# Patient Record
Sex: MALE | Race: WHITE | NOT HISPANIC OR LATINO | Employment: OTHER | URBAN - METROPOLITAN AREA
[De-identification: names, ages, dates, MRNs, and addresses within clinical notes are randomized per-mention and may not be internally consistent; named-entity substitution may affect disease eponyms.]

---

## 2024-01-24 ENCOUNTER — OFFICE VISIT (OUTPATIENT)
Dept: URGENT CARE | Facility: CLINIC | Age: 29
End: 2024-01-24
Payer: COMMERCIAL

## 2024-01-24 VITALS
HEART RATE: 100 BPM | OXYGEN SATURATION: 96 % | DIASTOLIC BLOOD PRESSURE: 84 MMHG | SYSTOLIC BLOOD PRESSURE: 128 MMHG | TEMPERATURE: 97.8 F | RESPIRATION RATE: 20 BRPM

## 2024-01-24 DIAGNOSIS — Z78.9 RECENT FOREIGN TRAVEL: ICD-10-CM

## 2024-01-24 DIAGNOSIS — J45.909 ACUTE ASTHMA (HHS-HCC): Primary | ICD-10-CM

## 2024-01-24 PROCEDURE — 1036F TOBACCO NON-USER: CPT | Performed by: PHYSICIAN ASSISTANT

## 2024-01-24 PROCEDURE — 99203 OFFICE O/P NEW LOW 30 MIN: CPT | Performed by: PHYSICIAN ASSISTANT

## 2024-01-24 RX ORDER — PREDNISONE 20 MG/1
20 TABLET ORAL 2 TIMES DAILY
Qty: 6 TABLET | Refills: 0 | Status: SHIPPED | OUTPATIENT
Start: 2024-01-24 | End: 2024-01-27

## 2024-01-24 RX ORDER — AZITHROMYCIN 250 MG/1
TABLET, FILM COATED ORAL
Qty: 6 TABLET | Refills: 0 | Status: SHIPPED | OUTPATIENT
Start: 2024-01-24

## 2024-01-24 ASSESSMENT — ENCOUNTER SYMPTOMS
COUGH: 1
WHEEZING: 1

## 2024-01-24 ASSESSMENT — PAIN SCALES - GENERAL: PAINLEVEL: 0-NO PAIN

## 2024-01-24 NOTE — LETTER
January 24, 2024     Patient: Taras Weir   YOB: 1995   Date of Visit: 1/24/2024       To Whom it May Concern:    Taras Weir was seen in my clinic on 1/24/2024. He may return to school on 25 January 2024 .    If you have any questions or concerns, please don't hesitate to call.         Sincerely,          Oliver Rosales PA-C        CC: No Recipients

## 2024-01-24 NOTE — PROGRESS NOTES
Subjective   Patient ID: Taras Weir is a 28 y.o. male.    Patient is a 28-year-old male who complains of increased wheezing and cough that he has been experiencing for the past 2 days.  Patient does have a history of asthma and reports that he has been using his inhalation medications as directed with no improvement in his symptoms.  Patient denies fever, chills or myalgia as well as congestion, sinus pressure, ear pain or sore throat.  Patient states that he did recently return from a vacation to [facility] via airline.      Wheezing  Associated symptoms: cough    The following portions of the chart were reviewed this encounter and updated as appropriate:       Review of Systems   Respiratory:  Positive for cough and wheezing.    All other systems reviewed and are negative.  Objective   Physical Exam  Vitals and nursing note reviewed.   Constitutional:       Appearance: Normal appearance. He is normal weight.   HENT:      Head: Normocephalic and atraumatic.      Right Ear: Tympanic membrane, ear canal and external ear normal.      Left Ear: Tympanic membrane, ear canal and external ear normal.      Nose: Nose normal. No congestion or rhinorrhea.      Mouth/Throat:      Mouth: Mucous membranes are moist.      Pharynx: Oropharynx is clear. No oropharyngeal exudate or posterior oropharyngeal erythema.   Eyes:      Extraocular Movements: Extraocular movements intact.      Conjunctiva/sclera: Conjunctivae normal.      Pupils: Pupils are equal, round, and reactive to light.   Cardiovascular:      Rate and Rhythm: Normal rate and regular rhythm.      Pulses: Normal pulses.      Heart sounds: Normal heart sounds.   Pulmonary:      Effort: Pulmonary effort is normal. No respiratory distress.      Breath sounds: Normal breath sounds. No stridor. No wheezing, rhonchi or rales.   Musculoskeletal:      Cervical back: Normal range of motion and neck supple.   Skin:     General: Skin is warm and dry.      Capillary Refill:  Capillary refill takes less than 2 seconds.   Neurological:      General: No focal deficit present.      Mental Status: He is alert and oriented to person, place, and time.   Psychiatric:         Mood and Affect: Mood normal.         Behavior: Behavior normal.         Thought Content: Thought content normal.         Judgment: Judgment normal.     Assessment/Plan   Physical exam findings as noted above.  Patient was provided with prescriptions for Zithromax 250 mg and prednisone 20 mg.  Patient states he does not require refill of any of his inhalation medications.  Supportive care instructions were discussed the patient verbalizes excellent understanding of same.    CLINICAL IMPRESSION:  Acute Asthma Exacerbation;  Recent Foreign Travel    Diagnoses and all orders for this visit:  Acute asthma  -     predniSONE (Deltasone) 20 mg tablet; Take 1 tablet (20 mg) by mouth 2 times a day for 3 days.  -     azithromycin (Zithromax) 250 mg tablet; Take 2 tablets (500 mg) on  Day 1,  followed by 1 tablet (250 mg) once daily on Days 2 through 5.  Recent foreign travel

## 2024-01-24 NOTE — LETTER
January 25, 2024     Patient: Taras Weir   YOB: 1995   Date of Visit: 1/24/2024       To Whom It May Concern:    It is my medical opinion that Taras Weir may return to work on 29 January 2024 .    If you have any questions or concerns, please don't hesitate to call.         Sincerely,        Oliver Rosales PA-C    CC: No Recipients

## 2024-03-05 ENCOUNTER — OFFICE VISIT (OUTPATIENT)
Dept: URGENT CARE | Facility: CLINIC | Age: 29
End: 2024-03-05
Payer: COMMERCIAL

## 2024-03-05 VITALS
SYSTOLIC BLOOD PRESSURE: 126 MMHG | DIASTOLIC BLOOD PRESSURE: 86 MMHG | RESPIRATION RATE: 20 BRPM | OXYGEN SATURATION: 100 % | HEART RATE: 98 BPM | TEMPERATURE: 100.3 F

## 2024-03-05 DIAGNOSIS — J10.1 INFLUENZA A: Primary | ICD-10-CM

## 2024-03-05 DIAGNOSIS — R50.9 FEVER AND CHILLS: ICD-10-CM

## 2024-03-05 LAB
POC TRIPLEX FLU A-AG: ABNORMAL
POC TRIPLEX FLU B-AG: ABNORMAL
POC TRIPLEX SARSCOV-2 AG: ABNORMAL

## 2024-03-05 PROCEDURE — 99213 OFFICE O/P EST LOW 20 MIN: CPT | Performed by: PHYSICIAN ASSISTANT

## 2024-03-05 PROCEDURE — 87428 SARSCOV & INF VIR A&B AG IA: CPT | Performed by: PHYSICIAN ASSISTANT

## 2024-03-05 PROCEDURE — 1036F TOBACCO NON-USER: CPT | Performed by: PHYSICIAN ASSISTANT

## 2024-03-05 RX ORDER — BENZONATATE 100 MG/1
100 CAPSULE ORAL 3 TIMES DAILY PRN
Qty: 30 CAPSULE | Refills: 0 | Status: SHIPPED | OUTPATIENT
Start: 2024-03-05 | End: 2024-03-15

## 2024-03-05 RX ORDER — OSELTAMIVIR PHOSPHATE 75 MG/1
75 CAPSULE ORAL 2 TIMES DAILY
Qty: 10 CAPSULE | Refills: 0 | Status: SHIPPED | OUTPATIENT
Start: 2024-03-05 | End: 2024-03-10

## 2024-03-05 ASSESSMENT — ENCOUNTER SYMPTOMS
FEVER: 1
MYALGIAS: 1
SORE THROAT: 1
FATIGUE: 1
CHILLS: 1
COUGH: 1

## 2024-03-05 ASSESSMENT — PAIN SCALES - GENERAL: PAINLEVEL: 7

## 2024-03-05 NOTE — PROGRESS NOTES
Subjective   Patient ID: Taras Weir is a 28 y.o. male.    Patient is a 28-year-old male who complains of fever, chills and myalgia that he has been experiencing for the past 3 days.  Patient reports that he has also developed a dry, burning cough.  Patient states that he lives with other individuals who have also developed the same symptoms over the past several days.  Patient denies congestion, sinus pressure or ear pain.  Patient does have a sore throat which he believes is secondary to repeated coughing.      The following portions of the chart were reviewed this encounter and updated as appropriate:       Review of Systems   Constitutional:  Positive for chills, fatigue and fever.   HENT:  Positive for sore throat.    Respiratory:  Positive for cough.    Musculoskeletal:  Positive for myalgias.   All other systems reviewed and are negative.  Objective   Physical Exam  Vitals and nursing note reviewed.   Constitutional:       Appearance: Normal appearance. He is normal weight.   HENT:      Head: Normocephalic and atraumatic.      Right Ear: Tympanic membrane, ear canal and external ear normal.      Left Ear: Tympanic membrane, ear canal and external ear normal.      Nose: Nose normal. No congestion or rhinorrhea.      Mouth/Throat:      Mouth: Mucous membranes are moist.      Pharynx: Oropharynx is clear. No oropharyngeal exudate or posterior oropharyngeal erythema.   Eyes:      Extraocular Movements: Extraocular movements intact.      Conjunctiva/sclera: Conjunctivae normal.      Pupils: Pupils are equal, round, and reactive to light.   Cardiovascular:      Rate and Rhythm: Normal rate and regular rhythm.      Pulses: Normal pulses.      Heart sounds: Normal heart sounds.   Pulmonary:      Effort: Pulmonary effort is normal. No respiratory distress.      Breath sounds: Normal breath sounds. No stridor. No wheezing, rhonchi or rales.   Musculoskeletal:      Cervical back: Normal range of motion and neck supple.    Skin:     General: Skin is warm and dry.      Capillary Refill: Capillary refill takes less than 2 seconds.   Neurological:      General: No focal deficit present.      Mental Status: He is alert and oriented to person, place, and time.   Psychiatric:         Mood and Affect: Mood normal.         Behavior: Behavior normal.         Thought Content: Thought content normal.         Judgment: Judgment normal.     Assessment/Plan   Physical exam findings as noted above.  Triplex Ag was performed and influenza A/B is positive for influenza Type A.  SARS-CoV-2 is negative.  Patient was provided with prescriptions for Tamiflu 75 mg and Tessalon 100 mg.  Supportive care instructions were discussed and the patient verbalizes good understanding of same.    CLINICAL IMPRESSION:  Influenza Type A    Diagnoses and all orders for this visit:  Influenza A  -     benzonatate (Tessalon Perles) 100 mg capsule; Take 1 capsule (100 mg) by mouth 3 times a day as needed for cough for up to 10 days.  -     oseltamivir (Tamiflu) 75 mg capsule; Take 1 capsule (75 mg) by mouth 2 times a day for 5 days.  Fever and chills  -     POCT BD Veritor Triplex Ag manually resulted

## 2024-03-05 NOTE — LETTER
March 5, 2024     Patient: Taras Weir   YOB: 1995   Date of Visit: 3/5/2024       To Whom it May Concern:    Taras Weir was seen in my clinic on 3/5/2024. He may return to school on 11 March 2024 .    If you have any questions or concerns, please don't hesitate to call.         Sincerely,          Oliver Rosales PA-C        CC: No Recipients

## 2024-05-08 ENCOUNTER — HOSPITAL ENCOUNTER (EMERGENCY)
Facility: HOSPITAL | Age: 29
Discharge: HOME | End: 2024-05-08
Payer: COMMERCIAL

## 2024-05-08 VITALS
TEMPERATURE: 97.2 F | DIASTOLIC BLOOD PRESSURE: 77 MMHG | RESPIRATION RATE: 18 BRPM | HEIGHT: 71 IN | OXYGEN SATURATION: 96 % | BODY MASS INDEX: 23.1 KG/M2 | HEART RATE: 84 BPM | WEIGHT: 165 LBS | SYSTOLIC BLOOD PRESSURE: 132 MMHG

## 2024-05-08 DIAGNOSIS — S05.02XA ABRASION OF LEFT CORNEA, INITIAL ENCOUNTER: ICD-10-CM

## 2024-05-08 DIAGNOSIS — S01.81XA FACIAL LACERATION, INITIAL ENCOUNTER: Primary | ICD-10-CM

## 2024-05-08 PROCEDURE — 12011 RPR F/E/E/N/L/M 2.5 CM/<: CPT | Performed by: NURSE PRACTITIONER

## 2024-05-08 PROCEDURE — 2500000001 HC RX 250 WO HCPCS SELF ADMINISTERED DRUGS (ALT 637 FOR MEDICARE OP): Performed by: NURSE PRACTITIONER

## 2024-05-08 PROCEDURE — 99283 EMERGENCY DEPT VISIT LOW MDM: CPT | Mod: 25

## 2024-05-08 RX ORDER — TETRACAINE HYDROCHLORIDE 5 MG/ML
1 SOLUTION OPHTHALMIC ONCE
Status: COMPLETED | OUTPATIENT
Start: 2024-05-08 | End: 2024-05-08

## 2024-05-08 RX ORDER — POLYMYXIN B SULFATE AND TRIMETHOPRIM 1; 10000 MG/ML; [USP'U]/ML
1 SOLUTION OPHTHALMIC
Qty: 10 ML | Refills: 0 | Status: SHIPPED | OUTPATIENT
Start: 2024-05-08 | End: 2024-05-15

## 2024-05-08 RX ORDER — POLYMYXIN B SULFATE AND TRIMETHOPRIM 1; 10000 MG/ML; [USP'U]/ML
1 SOLUTION OPHTHALMIC ONCE
Status: COMPLETED | OUTPATIENT
Start: 2024-05-08 | End: 2024-05-08

## 2024-05-08 RX ADMIN — TETRACAINE HYDROCHLORIDE 1 DROP: 5 SOLUTION OPHTHALMIC at 13:35

## 2024-05-08 RX ADMIN — FLUORESCEIN SODIUM 1 STRIP: 1 STRIP OPHTHALMIC at 13:35

## 2024-05-08 RX ADMIN — POLYMYXIN B SULFATE AND TRIMETHOPRIM 1 DROP: 10000; 1 SOLUTION OPHTHALMIC at 14:11

## 2024-05-08 ASSESSMENT — COLUMBIA-SUICIDE SEVERITY RATING SCALE - C-SSRS
2. HAVE YOU ACTUALLY HAD ANY THOUGHTS OF KILLING YOURSELF?: NO
1. IN THE PAST MONTH, HAVE YOU WISHED YOU WERE DEAD OR WISHED YOU COULD GO TO SLEEP AND NOT WAKE UP?: NO
6. HAVE YOU EVER DONE ANYTHING, STARTED TO DO ANYTHING, OR PREPARED TO DO ANYTHING TO END YOUR LIFE?: NO

## 2024-05-08 ASSESSMENT — VISUAL ACUITY
OD: 20/25
OU: 20/25
OS: 20/40

## 2024-05-08 NOTE — ED TRIAGE NOTES
Pt presents to ED for report of eye injury. Pt hit left under eye on the corner of a speaker last night. Pt lynn laceration under left eye. Bleeding controlled.

## 2024-05-08 NOTE — ED PROVIDER NOTES
HPI   Chief Complaint   Patient presents with    Eye Problem       Patient is a 28-year-old male with past med history of asthma who presents ED today after hitting his left face on the corner of a podium.  Patient states he was play fighting with a friend and fell into the podium.  He struck his face on the corner and it seemed to cut the cheek just below the eye.  He denies any vision changes, photophobia, or foreign body sensation.  Patient states he is up-to-date on his tetanus.  He was concerned because the wound is on his face and wanted it fixed.      History provided by:  Patient   used: No                        Bhupendra Coma Scale Score: 15                     Patient History   Past Medical History:   Diagnosis Date    Asthma ([facility])     Personal history of other diseases of the respiratory system     History of asthma     History reviewed. No pertinent surgical history.  No family history on file.  Social History     Tobacco Use    Smoking status: Never    Smokeless tobacco: Never   Vaping Use    Vaping status: Every Day   Substance Use Topics    Alcohol use: Yes     Comment: socially    Drug use: Not on file       Physical Exam   ED Triage Vitals [05/08/24 1308]   Temperature Heart Rate Respirations BP   36.2 °C (97.2 °F) 84 18 132/77      Pulse Ox Temp Source Heart Rate Source Patient Position   96 % Tympanic Monitor Sitting      BP Location FiO2 (%)     Right arm --       Physical Exam  Vitals and nursing note reviewed.   Constitutional:       General: He is not in acute distress.     Appearance: He is well-developed.   HENT:      Head: Normocephalic and atraumatic.        Comments: 0.5 cm laceration to the upper cheek inferior to the eyelid, bleeding controlled.  Healing by secondary intention.  Eyes:      Extraocular Movements: Extraocular movements intact.      Conjunctiva/sclera: Conjunctivae normal.      Pupils: Pupils are equal, round, and reactive to light.      Left eye:  Corneal abrasion and fluorescein uptake present. Cassie exam negative.       Comments: Small linear corneal defect over the iris between 4:00 and 5:00 location, occasion noted.  No periorbital tenderness to palpation, no visual gaze palsy concerning for orbital fracture.   Cardiovascular:      Rate and Rhythm: Normal rate and regular rhythm.      Heart sounds: No murmur heard.  Pulmonary:      Effort: Pulmonary effort is normal. No respiratory distress.      Breath sounds: Normal breath sounds.   Abdominal:      Palpations: Abdomen is soft.      Tenderness: There is no abdominal tenderness.   Musculoskeletal:         General: No swelling.      Cervical back: Neck supple.   Skin:     General: Skin is warm and dry.      Capillary Refill: Capillary refill takes less than 2 seconds.   Neurological:      Mental Status: He is alert.   Psychiatric:         Mood and Affect: Mood normal.         ED Course & MDM   Diagnoses as of 05/08/24 1357   Facial laceration, initial encounter   Abrasion of left cornea, initial encounter       Medical Decision Making  Differential diagnosis: Facial laceration, facial contusion, corneal abrasion, globe rupture, traumatic iritis, facial contusion.  Patient's vital signs are stable.  Patient's visual acuity does show slight deficit in the affected eye but he denies any vision changes.  Patient was more concerned about the laceration he sustained on his upper cheek near the eyelid.  Patient's EOMs are intact without pain or palsy concerning for orbital fracture.  I did stain the patient's eye and did observe a very small fluorescein uptake consistent with a corneal abrasion.  Patient has no flare around the iris concerning for traumatic iritis and his pupils are reactive.  Patient's laceration was already healing by secondary intention but given the location of the laceration we did opt for delayed closure with skin adhesive.  I did offer sutures as a modality for repair but patient declined  this and would prefer skin adhesive.  See procedure note.    I discussed the differential, results and discharge plan with the patient.  I emphasized the importance of follow-up with the physician I referred them to in the timeframe recommended.  I explained reasons for the them to return to the Emergency Department. Additional verbal discharge instructions were also given and discussed with them to supplement those generated by the EMR. We also discussed medications that were prescribed (if any) including common side effects and interactions. All questions were addressed.  They understand return precautions and discharge instructions. They expressed understanding.            Procedure  Laceration Repair    Performed by: EDWARDO Huang  Authorized by: EDWARDO Huang    Consent:     Consent obtained:  Verbal    Consent given by:  Patient    Risks, benefits, and alternatives were discussed: yes      Risks discussed:  Infection, pain, retained foreign body, need for additional repair, poor cosmetic result, poor wound healing, nerve damage and vascular damage    Alternatives discussed:  No treatment, delayed treatment, observation and referral  Universal protocol:     Procedure explained and questions answered to patient or proxy's satisfaction: yes      Relevant documents present and verified: yes      Required blood products, implants, devices, and special equipment available: yes      Immediately prior to procedure, a time out was called: yes      Patient identity confirmed:  Verbally with patient and arm band  Anesthesia:     Anesthesia method:  None  Laceration details:     Location:  Face    Face location:  L cheek    Length (cm):  1.5  Pre-procedure details:     Preparation:  Patient was prepped and draped in usual sterile fashion  Exploration:     Limited defect created (wound extended): no      Hemostasis achieved with:  Direct pressure    Imaging outcome: foreign body not noted      Wound  exploration: wound explored through full range of motion and entire depth of wound visualized    Treatment:     Area cleansed with:  Saline    Amount of cleaning:  Extensive    Irrigation solution:  Sterile saline    Irrigation method:  Pressure wash    Scar revision: no    Skin repair:     Repair method:  Tissue adhesive  Approximation:     Approximation:  Close  Repair type:     Repair type:  Simple  Post-procedure details:     Dressing:  Open (no dressing)    Procedure completion:  Tolerated well, no immediate complications       Jw Schwarz, JOAQUIM-CNP  05/08/24 8336

## 2024-05-08 NOTE — Clinical Note
Taras Weir was seen and treated in our emergency department on 5/8/2024.  He may return to school on 05/13/2024.      If you have any questions or concerns, please don't hesitate to call.      Jw Schwarz, JOAQUIM-CNP

## 2024-06-25 ENCOUNTER — OFFICE VISIT (OUTPATIENT)
Dept: URGENT CARE | Facility: CLINIC | Age: 29
End: 2024-06-25
Payer: COMMERCIAL

## 2024-06-25 VITALS
TEMPERATURE: 97.6 F | DIASTOLIC BLOOD PRESSURE: 79 MMHG | BODY MASS INDEX: 21.76 KG/M2 | HEART RATE: 86 BPM | WEIGHT: 156 LBS | RESPIRATION RATE: 16 BRPM | SYSTOLIC BLOOD PRESSURE: 114 MMHG | OXYGEN SATURATION: 97 %

## 2024-06-25 DIAGNOSIS — J30.9 ALLERGIC SINUSITIS: Primary | ICD-10-CM

## 2024-06-25 PROCEDURE — 1036F TOBACCO NON-USER: CPT | Performed by: PHYSICIAN ASSISTANT

## 2024-06-25 PROCEDURE — 99213 OFFICE O/P EST LOW 20 MIN: CPT | Performed by: PHYSICIAN ASSISTANT

## 2024-06-25 RX ORDER — PREDNISONE 20 MG/1
20 TABLET ORAL 2 TIMES DAILY
Qty: 10 TABLET | Refills: 0 | Status: SHIPPED | OUTPATIENT
Start: 2024-06-25 | End: 2024-06-30

## 2024-06-25 ASSESSMENT — ENCOUNTER SYMPTOMS
SINUS PRESSURE: 1
HEADACHES: 1
SINUS PAIN: 1

## 2024-06-25 ASSESSMENT — PAIN SCALES - GENERAL: PAINLEVEL: 0-NO PAIN

## 2024-06-25 NOTE — LETTER
June 25, 2024     Patient: Taras Weir   YOB: 1995   Date of Visit: 6/25/2024       To Whom it May Concern:    Taras Weir was seen in my clinic on 6/25/2024. He may return to school on 26 June 2024 .    If you have any questions or concerns, please don't hesitate to call.         Sincerely,          Oliver Rosales PA-C        CC: No Recipients

## 2024-06-25 NOTE — PROGRESS NOTES
Subjective   Patient ID: Taras Weir is a 28 y.o. male.    Patient is a 28-year-old male who complains of worsening nasal congestion and sinus pressure secondary to seasonal allergies that he has been experiencing for the past 2 days.  Patient denies ear pain, sore throat, cough or other illness symptoms.  Patient states he is otherwise feeling well and that he does develop significant allergy symptoms this time of the year.  Patient states that he is experiencing significant frontal headache.  Patient denies fever, chills or myalgia.  Patient states that he does typically take Allegra-D which normally controls his symptoms, however he reports no effect on his current sinus pressure and pain.      Headache  Associated symptoms: congestion and sinus pressure    The following portions of the chart were reviewed this encounter and updated as appropriate:       Review of Systems   HENT:  Positive for congestion, sinus pressure and sinus pain.    Neurological:  Positive for headaches.   All other systems reviewed and are negative.  Objective   Physical Exam  Vitals and nursing note reviewed.   Constitutional:       Appearance: Normal appearance. He is normal weight.   HENT:      Head: Normocephalic and atraumatic.      Right Ear: Tympanic membrane, ear canal and external ear normal.      Left Ear: Tympanic membrane, ear canal and external ear normal.      Nose: Nose normal. No congestion or rhinorrhea.      Mouth/Throat:      Mouth: Mucous membranes are moist.      Pharynx: Oropharynx is clear. No oropharyngeal exudate or posterior oropharyngeal erythema.   Eyes:      Extraocular Movements: Extraocular movements intact.      Conjunctiva/sclera: Conjunctivae normal.      Pupils: Pupils are equal, round, and reactive to light.   Cardiovascular:      Rate and Rhythm: Normal rate and regular rhythm.      Pulses: Normal pulses.      Heart sounds: Normal heart sounds.   Pulmonary:      Effort: Pulmonary effort is normal. No  respiratory distress.      Breath sounds: Normal breath sounds. No stridor. No wheezing, rhonchi or rales.   Musculoskeletal:      Cervical back: Normal range of motion and neck supple.   Skin:     General: Skin is warm and dry.      Capillary Refill: Capillary refill takes less than 2 seconds.   Neurological:      General: No focal deficit present.      Mental Status: He is alert and oriented to person, place, and time.   Psychiatric:         Mood and Affect: Mood normal.         Behavior: Behavior normal.         Thought Content: Thought content normal.         Judgment: Judgment normal.     Assessment/Plan   Physical exam findings as noted above.  Patient is in agreement that antibiotic treatment is not warranted at this time and he was provided with a prescription for prednisone 20 mg.  Supportive care instructions were discussed and the patient verbalizes excellent understanding of same.    CLINICAL IMPRESSION:  Allergic Sinusitis    Diagnoses and all orders for this visit:  Allergic sinusitis  -     predniSONE (Deltasone) 20 mg tablet; Take 1 tablet (20 mg) by mouth 2 times a day for 5 days.    Patient disposition: Home